# Patient Record
Sex: MALE | Race: WHITE | NOT HISPANIC OR LATINO | Employment: OTHER | URBAN - METROPOLITAN AREA
[De-identification: names, ages, dates, MRNs, and addresses within clinical notes are randomized per-mention and may not be internally consistent; named-entity substitution may affect disease eponyms.]

---

## 2020-08-10 ENCOUNTER — PATIENT OUTREACH (OUTPATIENT)
Dept: HEALTH INFORMATION MANAGEMENT | Facility: OTHER | Age: 70
End: 2020-08-10

## 2020-08-10 NOTE — PROGRESS NOTES
Called patient to schedule for AWV.    Outcome:   Left Message Please transfer to Patient Outreach Team at [phone] when patient returns call.     Attempt # 1    -aep

## 2020-08-25 NOTE — PROGRESS NOTES
Called patient to schedule for AWV.    Outcome:   Left Message Please transfer to Patient Outreach Team at [phone] when patient returns call.     Attempt # 2  -aep

## 2020-09-11 NOTE — PROGRESS NOTES
Called patient to schedule for AWV.    Outcome:   Left Message Please transfer to Patient Outreach Team at [phone] when patient returns call.     Attempt # 3  -aep